# Patient Record
Sex: MALE | Race: WHITE | NOT HISPANIC OR LATINO
[De-identification: names, ages, dates, MRNs, and addresses within clinical notes are randomized per-mention and may not be internally consistent; named-entity substitution may affect disease eponyms.]

---

## 2021-03-03 PROBLEM — Z00.00 ENCOUNTER FOR PREVENTIVE HEALTH EXAMINATION: Status: ACTIVE | Noted: 2021-03-03

## 2021-03-05 ENCOUNTER — APPOINTMENT (OUTPATIENT)
Dept: COLORECTAL SURGERY | Facility: CLINIC | Age: 56
End: 2021-03-05
Payer: COMMERCIAL

## 2021-03-05 VITALS
HEART RATE: 64 BPM | SYSTOLIC BLOOD PRESSURE: 120 MMHG | TEMPERATURE: 97.79 F | WEIGHT: 260.19 LBS | HEIGHT: 72 IN | BODY MASS INDEX: 35.24 KG/M2 | OXYGEN SATURATION: 96 % | DIASTOLIC BLOOD PRESSURE: 70 MMHG

## 2021-03-05 DIAGNOSIS — K57.90 DIVERTICULOSIS OF INTESTINE, PART UNSPECIFIED, W/OUT PERFORATION OR ABSCESS W/OUT BLEEDING: ICD-10-CM

## 2021-03-05 DIAGNOSIS — Z81.8 FAMILY HISTORY OF OTHER MENTAL AND BEHAVIORAL DISORDERS: ICD-10-CM

## 2021-03-05 DIAGNOSIS — Z87.891 PERSONAL HISTORY OF NICOTINE DEPENDENCE: ICD-10-CM

## 2021-03-05 DIAGNOSIS — K61.0 ANAL ABSCESS: ICD-10-CM

## 2021-03-05 DIAGNOSIS — R10.32 LEFT LOWER QUADRANT PAIN: ICD-10-CM

## 2021-03-05 DIAGNOSIS — Z80.3 FAMILY HISTORY OF MALIGNANT NEOPLASM OF BREAST: ICD-10-CM

## 2021-03-05 PROCEDURE — 99072 ADDL SUPL MATRL&STAF TM PHE: CPT

## 2021-03-05 PROCEDURE — 99203 OFFICE O/P NEW LOW 30 MIN: CPT

## 2021-03-05 NOTE — HISTORY OF PRESENT ILLNESS
[FreeTextEntry1] : 55 year old male who underwent colonoscopies in aprila nd october 2020 for multiple polyps.  As per path is sounds like all polyps have been removed.  Several were decent sized.   Dr. Ramon will do f/u TFC in 6 months or so.  Also noted to have multiple diverticuli.  Has had episodic left sided abdominal  pain but not ever treated for diverticulitis . Not had any documented attacks.  NO Rx either.\par \par Last week underwent I & D of anal abscess in [facility] ED.   Also treated with antibiotics.  Still draining a drop.  No prior episodes of anal abscess but has had an anal fissure.   \par \par Family hx for colon polyps (brother).  No known hx cancer.

## 2021-03-05 NOTE — PHYSICAL EXAM
[Ulcer ___ cm] : a [unfilled] ~Ucm ulcer [Normal] : was normal [Normal Breath Sounds] : Normal breath sounds [Normal Heart Sounds] : normal heart sounds [2+] : left 2+ [No Rash or Lesion] : No rash or lesion [Alert] : alert [Oriented to Person] : oriented to person [Oriented to Place] : oriented to place [Oriented to Time] : oriented to time [Calm] : calm [Abdomen Masses] : No abdominal masses [Abdomen Tenderness] : ~T No ~M abdominal tenderness [Excoriation] : no perianal excoriation [Fistula] : no fistulas [Wart] : no warts [Tender, Swollen] : nontender, non-swollen [Thrombosed] : that was not thrombosed [Skin Tags] : there were no residual hemorrhoidal skin tags seen [Stool Sample Taken] : no stool obtained on rectal exam [Gross Blood] : no gross blood [JVD] : no jugular venous distention  [Thyroid] : the thyroid was abnormal [Carotid Bruits] : no carotid bruits [de-identified] : soft, non tender, no mass.  ? small RIH, L side is ok.   [de-identified] : I and D site (posterior deinse-circumference) is healing.   No surrounding induration. not yet fully healed [de-identified] : deferred digital and anoscopy due to healing abscess [de-identified] : NAD

## 2021-03-05 NOTE — ASSESSMENT
[FreeTextEntry1] : Three issues:\par 1. polyps: Dr. Ramon to follow up exam in 6 months.  She is handling that.\par 2. anal abscess:  s/p I and D:  wound is healing and there is no induration or abscess or obvious fistula now.  wound 2/3rds healed\par 3. Diverticulosis / abdominal pain: no documented diverticulitis and no sustained painful episodes.  May have IBS or some diverticulosis related sx's.  If he develops persistent sx's or gets fever and LLQ pain or other sx's will call and get a CT scan.\par \par \par Dietary sheet with fiber additivie and other info given\par Diverticulitis information from web site given.\par F/U TFC with Dr. Ramon.  \par Sitz baths for healing I and D site.

## 2021-05-28 ENCOUNTER — APPOINTMENT (OUTPATIENT)
Dept: COLORECTAL SURGERY | Facility: CLINIC | Age: 56
End: 2021-05-28
Payer: COMMERCIAL

## 2021-05-28 VITALS
TEMPERATURE: 98.8 F | HEIGHT: 72 IN | WEIGHT: 260 LBS | DIASTOLIC BLOOD PRESSURE: 94 MMHG | HEART RATE: 60 BPM | BODY MASS INDEX: 35.21 KG/M2 | SYSTOLIC BLOOD PRESSURE: 129 MMHG | OXYGEN SATURATION: 98 %

## 2021-05-28 PROCEDURE — 99214 OFFICE O/P EST MOD 30 MIN: CPT | Mod: 25

## 2021-05-28 PROCEDURE — 46600 DIAGNOSTIC ANOSCOPY SPX: CPT

## 2021-05-28 NOTE — ASSESSMENT
[FreeTextEntry1] : Posterior midline EO and radial tract to point just R of midline.  IO at probable healed posterior fissure site.  Possible fistula related to a fissure.\par Now has daily drainage and some intermittent d/c.\par Recalls having had a fissure 20-30 years ago. \par \par Wants Rx.\par Discussed fistulotomy or cutting seton if not any or not much muscle involved.\par Discussed loose seton and then possible LIFT or flap if he turns out to have transphincteric fistula involving decent amount of muscle.  \par Risks and benefits of surgery discussed.  \par Drawings and web site section given to patient\par

## 2021-05-28 NOTE — HISTORY OF PRESENT ILLNESS
[FreeTextEntry1] : Dr. Fly Ramon is GI andis following patient with frequent TFC's. Patient has had numerous colon polyps\par Brother has had colon polyps removed\par Dad with lung cancer.  Sister with breast cancer. \par \par \par 55 year old male seen in March had undergone i & d  of anal abscess in [place].  Came to us for wound, \par No obvious fistula was seen . It seemed to be  completely healed by the end of March  and then over the next few days it did become  inflamed,    it's been draining  since then. \par \par BM x 1 /day\par No incontinence of flatus and stool.  \par Distant hx of anal fissure about 15 years ago (sx was bleeding).  No recent symptoms. \par \par No meds\par \par PSH: Shoulder op\par No anal operations\par No abdominal procedures

## 2021-05-28 NOTE — PHYSICAL EXAM
[Fistula] : a fistula [Reduce Spontaneously] : a spontaneously reducible (grade II) [Normal] : was normal [Posterior] : posteriorly [None] : there was no rectal mass  [Normal Breath Sounds] : Normal breath sounds [Normal Heart Sounds] : normal heart sounds [2+] : left 2+ [No Rash or Lesion] : No rash or lesion [Alert] : alert [Oriented to Person] : oriented to person [Oriented to Place] : oriented to place [Oriented to Time] : oriented to time [Calm] : calm [Abdomen Masses] : No abdominal masses [Abdomen Tenderness] : ~T No ~M abdominal tenderness [Excoriation] : no perianal excoriation [Wart] : no warts [Ulcer ___ cm] : no ulcers [Tender, Swollen] : nontender, non-swollen [Thrombosed] : that was not thrombosed [Skin Tags] : there were no residual hemorrhoidal skin tags seen [Stool Sample Taken] : no stool obtained on rectal exam [Gross Blood] : no gross blood [JVD] : no jugular venous distention  [Thyroid] : the thyroid was abnormal [Carotid Bruits] : no carotid bruits [de-identified] : benign, not distended [de-identified] : EO located just off posterior midline to R about 3 cm from verge.  Tract runs radially and little to the R.  pus extrudable on tract palpation, [de-identified] : digital: tone WNL, sphincter has good length [de-identified] : anoscopy: ? IO mid anoderm level posterior (? healed old fissure but no fissure open now) [de-identified] : NAD

## 2021-06-09 ENCOUNTER — LABORATORY RESULT (OUTPATIENT)
Age: 56
End: 2021-06-09

## 2021-06-11 LAB
ALBUMIN SERPL ELPH-MCNC: 4.9 G/DL
ALP BLD-CCNC: 55 U/L
ALT SERPL-CCNC: 21 U/L
ANION GAP SERPL CALC-SCNC: 13 MMOL/L
APTT 2H P 1:4 NP PPP: 34.4 SEC
APTT 2H P INC PPP: 34.6 SEC
APTT IMM NP/PRE NP PPP: 33.8 SEC
APTT INV RATIO PPP: 36.2 SEC
AST SERPL-CCNC: 18 U/L
BASOPHILS # BLD AUTO: 0.04 K/UL
BASOPHILS NFR BLD AUTO: 0.7 %
BILIRUB SERPL-MCNC: 0.5 MG/DL
BUN SERPL-MCNC: 15 MG/DL
CALCIUM SERPL-MCNC: 9.9 MG/DL
CHLORIDE SERPL-SCNC: 102 MMOL/L
CO2 SERPL-SCNC: 25 MMOL/L
CREAT SERPL-MCNC: 1.07 MG/DL
EOSINOPHIL # BLD AUTO: 0.14 K/UL
EOSINOPHIL NFR BLD AUTO: 2.4 %
GLUCOSE SERPL-MCNC: 87 MG/DL
HCT VFR BLD CALC: 43.5 %
HGB BLD-MCNC: 14.6 G/DL
IMM GRANULOCYTES NFR BLD AUTO: 0.3 %
INR PPP: 1 RATIO
LYMPHOCYTES # BLD AUTO: 2.77 K/UL
LYMPHOCYTES NFR BLD AUTO: 47.4 %
MAN DIFF?: NORMAL
MCHC RBC-ENTMCNC: 31.3 PG
MCHC RBC-ENTMCNC: 33.6 GM/DL
MCV RBC AUTO: 93.1 FL
MONOCYTES # BLD AUTO: 0.47 K/UL
MONOCYTES NFR BLD AUTO: 8 %
NEUTROPHILS # BLD AUTO: 2.4 K/UL
NEUTROPHILS NFR BLD AUTO: 41.2 %
NPP NORMAL POOLED PLASMA: 33 SECS
PLATELET # BLD AUTO: 213 K/UL
POTASSIUM SERPL-SCNC: 4.9 MMOL/L
PROT SERPL-MCNC: 7.7 G/DL
PT BLD: 11.8 SEC
RBC # BLD: 4.67 M/UL
RBC # FLD: 12.6 %
SODIUM SERPL-SCNC: 139 MMOL/L
WBC # FLD AUTO: 5.84 K/UL

## 2021-06-16 ENCOUNTER — TRANSCRIPTION ENCOUNTER (OUTPATIENT)
Age: 56
End: 2021-06-16

## 2021-06-17 ENCOUNTER — OUTPATIENT (OUTPATIENT)
Dept: OUTPATIENT SERVICES | Facility: HOSPITAL | Age: 56
LOS: 1 days | Discharge: HOME | End: 2021-06-17
Payer: COMMERCIAL

## 2021-06-17 ENCOUNTER — APPOINTMENT (OUTPATIENT)
Dept: COLORECTAL SURGERY | Facility: HOSPITAL | Age: 56
End: 2021-06-17

## 2021-06-17 ENCOUNTER — RESULT REVIEW (OUTPATIENT)
Age: 56
End: 2021-06-17

## 2021-06-17 PROCEDURE — 88304 TISSUE EXAM BY PATHOLOGIST: CPT | Mod: 26

## 2021-06-17 PROCEDURE — 46280 REMOVE ANAL FIST COMPLEX: CPT

## 2021-06-17 RX ORDER — KETOROLAC TROMETHAMINE 30 MG/ML
1 SYRINGE (ML) INJECTION
Qty: 12 | Refills: 0
Start: 2021-06-17 | End: 2021-06-19

## 2021-06-17 RX ORDER — DOCUSATE SODIUM 100 MG
1 CAPSULE ORAL
Qty: 56 | Refills: 0
Start: 2021-06-17 | End: 2021-06-30

## 2021-06-17 RX ORDER — PSYLLIUM SEED (WITH DEXTROSE)
3.4 POWDER (GRAM) ORAL
Qty: 47.6 | Refills: 0
Start: 2021-06-17 | End: 2021-06-30

## 2021-06-17 RX ORDER — MINERAL OIL
15 OIL (ML) MISCELLANEOUS
Qty: 210 | Refills: 0
Start: 2021-06-17 | End: 2021-06-30

## 2021-06-29 ENCOUNTER — APPOINTMENT (OUTPATIENT)
Dept: COLORECTAL SURGERY | Facility: CLINIC | Age: 56
End: 2021-06-29
Payer: COMMERCIAL

## 2021-06-29 VITALS
TEMPERATURE: 97.4 F | OXYGEN SATURATION: 97 % | BODY MASS INDEX: 34.57 KG/M2 | DIASTOLIC BLOOD PRESSURE: 68 MMHG | HEIGHT: 72 IN | WEIGHT: 255.25 LBS | HEART RATE: 62 BPM | SYSTOLIC BLOOD PRESSURE: 107 MMHG

## 2021-06-29 DIAGNOSIS — Z98.890 OTHER SPECIFIED POSTPROCEDURAL STATES: ICD-10-CM

## 2021-06-29 PROCEDURE — 99024 POSTOP FOLLOW-UP VISIT: CPT

## 2021-06-29 NOTE — ASSESSMENT
[FreeTextEntry1] : S/p fistulotomy with placement of seton and excision of anal papilla. \par \par \par pathology is pending. \par \par continue warm soaks,  use hair dryer or fan to blow dry, \par shown how to reposition setons if they rotate into the wound. \par follow up in 6 weeks with Law.

## 2021-06-29 NOTE — PHYSICAL EXAM
[FreeTextEntry1] : PE:\par \par AOA\par chest: CTA bilaterally\par CV: S1S2, RRR\par Abdomen: soft, non tender\par external anal:  setons in place x's 2 small \par ext: no edema or CTamount of discharge noted, ,  perineal incision clean, healing well, sutures noted.  No induration or erythema.

## 2021-06-29 NOTE — HISTORY OF PRESENT ILLNESS
[FreeTextEntry1] : 56 year old mal s/p partial fistulotomy, placement of non cutting anal setons and excision of anal papilla. \par co/ discomfort,  no fevers,  still with some discharge. Doing warm baths.  Not taking any pain med\par bowel movements are daily, on stool softeners and fiber.  \par \par Voiding fine. \par

## 2021-06-30 LAB — SURGICAL PATHOLOGY STUDY: SIGNIFICANT CHANGE UP

## 2021-08-09 ENCOUNTER — APPOINTMENT (OUTPATIENT)
Dept: COLORECTAL SURGERY | Facility: CLINIC | Age: 56
End: 2021-08-09
Payer: COMMERCIAL

## 2021-08-09 VITALS
BODY MASS INDEX: 35.01 KG/M2 | WEIGHT: 258.44 LBS | TEMPERATURE: 97.6 F | HEIGHT: 72 IN | HEART RATE: 64 BPM | DIASTOLIC BLOOD PRESSURE: 69 MMHG | OXYGEN SATURATION: 98 % | SYSTOLIC BLOOD PRESSURE: 110 MMHG

## 2021-08-09 DIAGNOSIS — K60.3 ANAL FISTULA: ICD-10-CM

## 2021-08-09 PROCEDURE — 46600 DIAGNOSTIC ANOSCOPY SPX: CPT | Mod: 78

## 2021-08-09 PROCEDURE — 99024 POSTOP FOLLOW-UP VISIT: CPT

## 2021-08-09 NOTE — HISTORY OF PRESENT ILLNESS
[FreeTextEntry1] : 56 year old mal s/p partial fistulotomy, placement of non cutting anal setons and excision of anal papilla. \par co/ discomfort,  no fevers,  still with some discharge.  complains of  occasional  sharp pain every now and then. \par \par Has some irritation when sitting ? from seton.  Livinig with seton but wants definitive op if possible.  \par Having BM's more often but is taking 2 tablespoons of metamucil per day and the resulting stool is not formed (normally is formed).  \par \par \par

## 2021-08-09 NOTE — PHYSICAL EXAM
[Fistula] : a fistula [Reduce Spontaneously] : a spontaneously reducible (grade II) [Normal] : was normal [Posterior] : posteriorly [None] : there was no rectal mass  [Normal Breath Sounds] : Normal breath sounds [Normal Heart Sounds] : normal heart sounds [Alert] : alert [Oriented to Person] : oriented to person [Oriented to Place] : oriented to place [Oriented to Time] : oriented to time [Calm] : calm [Excoriation] : no perianal excoriation [Wart] : no warts [Ulcer ___ cm] : no ulcers [Tender, Swollen] : nontender, non-swollen [Stool Sample Taken] : no stool obtained on rectal exam [Gross Blood] : no gross blood [de-identified] : benign [de-identified] : setons x 2 in 1 tract in place, no signifincat induration except at tract itself. Digital: IO hard to palpate, sphincter tone is WNL and he has a long sphincter.  Feels like IO is at or just proximal to the the intersphincteric groove.  no masses.  Anoscopy (adult scope) shows IO close to dentate line (hard to see). [de-identified] : near setons, mild with palpation [de-identified] : NAD

## 2021-09-08 ENCOUNTER — TRANSCRIPTION ENCOUNTER (OUTPATIENT)
Age: 56
End: 2021-09-08

## 2021-09-08 ENCOUNTER — LABORATORY RESULT (OUTPATIENT)
Age: 56
End: 2021-09-08

## 2021-09-09 ENCOUNTER — OUTPATIENT (OUTPATIENT)
Dept: OUTPATIENT SERVICES | Facility: HOSPITAL | Age: 56
LOS: 1 days | Discharge: HOME | End: 2021-09-09
Payer: COMMERCIAL

## 2021-09-09 ENCOUNTER — APPOINTMENT (OUTPATIENT)
Dept: COLORECTAL SURGERY | Facility: HOSPITAL | Age: 56
End: 2021-09-09

## 2021-09-09 ENCOUNTER — RESULT REVIEW (OUTPATIENT)
Age: 56
End: 2021-09-09

## 2021-09-09 PROCEDURE — 88304 TISSUE EXAM BY PATHOLOGIST: CPT | Mod: 26

## 2021-09-09 PROCEDURE — 46280 REMOVE ANAL FIST COMPLEX: CPT | Mod: 58

## 2021-09-09 RX ORDER — KETOROLAC TROMETHAMINE 30 MG/ML
1 SYRINGE (ML) INJECTION
Qty: 12 | Refills: 0
Start: 2021-09-09 | End: 2021-09-11

## 2021-09-09 RX ORDER — DOCUSATE SODIUM 100 MG
1 CAPSULE ORAL
Qty: 120 | Refills: 0
Start: 2021-09-09 | End: 2021-10-08

## 2021-09-09 RX ORDER — METRONIDAZOLE 500 MG
1 TABLET ORAL
Qty: 15 | Refills: 0
Start: 2021-09-09 | End: 2021-09-13

## 2021-09-20 ENCOUNTER — APPOINTMENT (OUTPATIENT)
Dept: COLORECTAL SURGERY | Facility: CLINIC | Age: 56
End: 2021-09-20
Payer: COMMERCIAL

## 2021-09-20 VITALS
SYSTOLIC BLOOD PRESSURE: 149 MMHG | HEART RATE: 67 BPM | TEMPERATURE: 98.8 F | OXYGEN SATURATION: 99 % | DIASTOLIC BLOOD PRESSURE: 90 MMHG

## 2021-09-20 LAB
ANION GAP SERPL CALC-SCNC: 13 MMOL/L
BASOPHILS # BLD AUTO: 0.05 K/UL
BASOPHILS NFR BLD AUTO: 0.7 %
BUN SERPL-MCNC: 17 MG/DL
CALCIUM SERPL-MCNC: 9.7 MG/DL
CHLORIDE SERPL-SCNC: 101 MMOL/L
CO2 SERPL-SCNC: 25 MMOL/L
CREAT SERPL-MCNC: 1.03 MG/DL
EOSINOPHIL # BLD AUTO: 0.17 K/UL
EOSINOPHIL NFR BLD AUTO: 2.4 %
GLUCOSE SERPL-MCNC: 81 MG/DL
HCT VFR BLD CALC: 46.2 %
HGB BLD-MCNC: 15.4 G/DL
IMM GRANULOCYTES NFR BLD AUTO: 0.4 %
INR PPP: 0.84
LYMPHOCYTES # BLD AUTO: 3.07 K/UL
LYMPHOCYTES NFR BLD AUTO: 42.5 %
MAN DIFF?: NORMAL
MCHC RBC-ENTMCNC: 32.1 PG
MCHC RBC-ENTMCNC: 33.3 GM/DL
MCV RBC AUTO: 96.3 FL
MONOCYTES # BLD AUTO: 0.54 K/UL
MONOCYTES NFR BLD AUTO: 7.5 %
NEUTROPHILS # BLD AUTO: 3.37 K/UL
NEUTROPHILS NFR BLD AUTO: 46.5 %
PLATELET # BLD AUTO: 218 K/UL
POTASSIUM SERPL-SCNC: 4.8 MMOL/L
PT BLD: 10.2 SEC
RBC # BLD: 4.8 M/UL
RBC # FLD: 13.5 %
SODIUM SERPL-SCNC: 139 MMOL/L
SURGICAL PATHOLOGY STUDY: SIGNIFICANT CHANGE UP
WBC # FLD AUTO: 7.23 K/UL

## 2021-09-20 PROCEDURE — 99212 OFFICE O/P EST SF 10 MIN: CPT

## 2021-09-20 NOTE — PHYSICAL EXAM
[FreeTextEntry1] : PE: \par \par AOA, looks well \par External anal:  2 sutures have come loose and wound has opened. \par no induration, no erythema, no pus, \par \par Seen and examined by Law as well

## 2021-09-20 NOTE — ASSESSMENT
[FreeTextEntry1] : Wound has opened.  Patient was told that we need to wait and see if it will  fill it.  Will take time to fill in/ granulate, \par he needs to continue sits baths/showers,  to keep it clean. He was told that there may need to be another procedure. \par to follow up in 6 weeks.

## 2021-09-20 NOTE — HISTORY OF PRESENT ILLNESS
[FreeTextEntry1] : Patient is post op LIFT x's 2 weeks.  Worried  because the wound has "opened" \par some discharge, still wearing pad, no incontinence of stool or flatus, \par no fevers, no difficulty urinating.

## 2021-09-20 NOTE — REVIEW OF SYSTEMS
[Negative] : Endocrine [Fever] : no fever [Feeling Poorly] : not feeling poorly [Feeling Tired] : not feeling tired [Recent Weight Gain (___ Lbs)] : no recent weight gain [Recent Weight Loss (___ Lbs)] : no recent weight loss

## 2021-10-12 ENCOUNTER — APPOINTMENT (OUTPATIENT)
Dept: COLORECTAL SURGERY | Facility: CLINIC | Age: 56
End: 2021-10-12
Payer: COMMERCIAL

## 2021-10-12 VITALS
OXYGEN SATURATION: 98 % | DIASTOLIC BLOOD PRESSURE: 89 MMHG | SYSTOLIC BLOOD PRESSURE: 147 MMHG | TEMPERATURE: 98.9 F | HEART RATE: 68 BPM

## 2021-10-12 PROCEDURE — 99024 POSTOP FOLLOW-UP VISIT: CPT

## 2021-10-12 NOTE — ASSESSMENT
[FreeTextEntry1] : Looks good,  \par given topical calmoseptine, may help complete the wound healing. \par follow up with Law in 2-3 weeks for anoscopy. \par

## 2021-10-12 NOTE — HISTORY OF PRESENT ILLNESS
[FreeTextEntry1] : Patient is s/p LIFT done september 9. \par In follow up there had been some opening of sutures early on. \par \par Now feeling better,   no pain, minimal bleed\par no incontinence of gas or flatus. \par still doing showers/ baths. Also taking zinc and vit C supplements

## 2021-10-12 NOTE — PHYSICAL EXAM
[FreeTextEntry1] : PE: \par \par AOA,  no distress\par External anal:  soft, no induration, or erythema, no pus,\par tiny superficial wound with spot of bleeding posteriorly.\par Gentle limited digital finds no defect.

## 2021-10-29 ENCOUNTER — APPOINTMENT (OUTPATIENT)
Dept: COLORECTAL SURGERY | Facility: CLINIC | Age: 56
End: 2021-10-29
Payer: COMMERCIAL

## 2021-10-29 VITALS
HEART RATE: 69 BPM | DIASTOLIC BLOOD PRESSURE: 80 MMHG | OXYGEN SATURATION: 96 % | TEMPERATURE: 98.1 F | SYSTOLIC BLOOD PRESSURE: 115 MMHG

## 2021-10-29 PROCEDURE — 99024 POSTOP FOLLOW-UP VISIT: CPT

## 2021-10-29 NOTE — PHYSICAL EXAM
[Excoriation] : no perianal excoriation [Fistula] : no fistulas [Wart] : no warts [Ulcer ___ cm] : no ulcers [Tender, Swollen] : nontender, non-swollen [Thrombosed] : that was not thrombosed [Skin Tags] : there were no residual hemorrhoidal skin tags seen [Normal] : was normal [None] : there was no rectal mass  [Stool Sample Taken] : no stool obtained on rectal exam [Gross Blood] : no gross blood [de-identified] : external: looks closed fully.  no EO seen.  some mild induration along the scar. Digital:  no masses, no IO felt, no induration.  Feels WNL on digital.  No anoscopy done.

## 2021-10-29 NOTE — ASSESSMENT
[FreeTextEntry1] : Looks fully healed at this point.\par No symptoms and patient is happy.\par Advised to continue fiber supplements and increased water and to watch his diet.\par To RTC prn

## 2021-10-29 NOTE — HISTORY OF PRESENT ILLNESS
[FreeTextEntry1] : About 6 weeks s/p LIFT procedure\par No bleeding or complaints.\par Having daily BM's\par Still taking the metamucil.\par stopped the colace\par No pain meds\par still taking the sitz baths\par